# Patient Record
Sex: FEMALE | Race: WHITE | NOT HISPANIC OR LATINO | URBAN - METROPOLITAN AREA
[De-identification: names, ages, dates, MRNs, and addresses within clinical notes are randomized per-mention and may not be internally consistent; named-entity substitution may affect disease eponyms.]

---

## 2024-10-23 ENCOUNTER — EMERGENCY (EMERGENCY)
Facility: HOSPITAL | Age: 54
LOS: 1 days | Discharge: HOME | End: 2024-10-23
Attending: STUDENT IN AN ORGANIZED HEALTH CARE EDUCATION/TRAINING PROGRAM
Payer: COMMERCIAL

## 2024-10-23 VITALS
HEART RATE: 99 BPM | TEMPERATURE: 99 F | DIASTOLIC BLOOD PRESSURE: 84 MMHG | OXYGEN SATURATION: 98 % | RESPIRATION RATE: 18 BRPM | SYSTOLIC BLOOD PRESSURE: 129 MMHG

## 2024-10-23 VITALS
OXYGEN SATURATION: 98 % | TEMPERATURE: 98 F | HEIGHT: 62 IN | SYSTOLIC BLOOD PRESSURE: 145 MMHG | WEIGHT: 147.93 LBS | DIASTOLIC BLOOD PRESSURE: 87 MMHG | RESPIRATION RATE: 20 BRPM | HEART RATE: 107 BPM

## 2024-10-23 DIAGNOSIS — N93.9 ABNORMAL UTERINE AND VAGINAL BLEEDING, UNSPECIFIED: ICD-10-CM

## 2024-10-23 LAB
ALBUMIN SERPL ELPH-MCNC: 4.2 G/DL — SIGNIFICANT CHANGE UP (ref 3.3–5.2)
ALP SERPL-CCNC: 112 U/L — SIGNIFICANT CHANGE UP (ref 40–120)
ALT FLD-CCNC: 23 U/L — SIGNIFICANT CHANGE UP
ANION GAP SERPL CALC-SCNC: 14 MMOL/L — SIGNIFICANT CHANGE UP (ref 5–17)
AST SERPL-CCNC: 69 U/L — HIGH
BASOPHILS # BLD AUTO: 0.05 K/UL — SIGNIFICANT CHANGE UP (ref 0–0.2)
BASOPHILS NFR BLD AUTO: 0.7 % — SIGNIFICANT CHANGE UP (ref 0–2)
BILIRUB SERPL-MCNC: 0.2 MG/DL — LOW (ref 0.4–2)
BUN SERPL-MCNC: 10.3 MG/DL — SIGNIFICANT CHANGE UP (ref 8–20)
CALCIUM SERPL-MCNC: 9.3 MG/DL — SIGNIFICANT CHANGE UP (ref 8.4–10.5)
CHLORIDE SERPL-SCNC: 101 MMOL/L — SIGNIFICANT CHANGE UP (ref 96–108)
CO2 SERPL-SCNC: 22 MMOL/L — SIGNIFICANT CHANGE UP (ref 22–29)
CREAT SERPL-MCNC: 0.5 MG/DL — SIGNIFICANT CHANGE UP (ref 0.5–1.3)
EGFR: 110 ML/MIN/1.73M2 — SIGNIFICANT CHANGE UP
EOSINOPHIL # BLD AUTO: 0.07 K/UL — SIGNIFICANT CHANGE UP (ref 0–0.5)
EOSINOPHIL NFR BLD AUTO: 0.9 % — SIGNIFICANT CHANGE UP (ref 0–6)
GLUCOSE SERPL-MCNC: 114 MG/DL — HIGH (ref 70–99)
HCG SERPL-ACNC: <4 MIU/ML — SIGNIFICANT CHANGE UP
HCT VFR BLD CALC: 33.7 % — LOW (ref 34.5–45)
HGB BLD-MCNC: 11 G/DL — LOW (ref 11.5–15.5)
IMM GRANULOCYTES NFR BLD AUTO: 0.3 % — SIGNIFICANT CHANGE UP (ref 0–0.9)
LYMPHOCYTES # BLD AUTO: 2 K/UL — SIGNIFICANT CHANGE UP (ref 1–3.3)
LYMPHOCYTES # BLD AUTO: 26.6 % — SIGNIFICANT CHANGE UP (ref 13–44)
MCHC RBC-ENTMCNC: 27.6 PG — SIGNIFICANT CHANGE UP (ref 27–34)
MCHC RBC-ENTMCNC: 32.6 GM/DL — SIGNIFICANT CHANGE UP (ref 32–36)
MCV RBC AUTO: 84.7 FL — SIGNIFICANT CHANGE UP (ref 80–100)
MONOCYTES # BLD AUTO: 0.6 K/UL — SIGNIFICANT CHANGE UP (ref 0–0.9)
MONOCYTES NFR BLD AUTO: 8 % — SIGNIFICANT CHANGE UP (ref 2–14)
NEUTROPHILS # BLD AUTO: 4.78 K/UL — SIGNIFICANT CHANGE UP (ref 1.8–7.4)
NEUTROPHILS NFR BLD AUTO: 63.5 % — SIGNIFICANT CHANGE UP (ref 43–77)
PLATELET # BLD AUTO: 226 K/UL — SIGNIFICANT CHANGE UP (ref 150–400)
POTASSIUM SERPL-MCNC: 5.2 MMOL/L — SIGNIFICANT CHANGE UP (ref 3.5–5.3)
POTASSIUM SERPL-SCNC: 5.2 MMOL/L — SIGNIFICANT CHANGE UP (ref 3.5–5.3)
PROT SERPL-MCNC: 7.8 G/DL — SIGNIFICANT CHANGE UP (ref 6.6–8.7)
RBC # BLD: 3.98 M/UL — SIGNIFICANT CHANGE UP (ref 3.8–5.2)
RBC # FLD: 12.5 % — SIGNIFICANT CHANGE UP (ref 10.3–14.5)
SODIUM SERPL-SCNC: 137 MMOL/L — SIGNIFICANT CHANGE UP (ref 135–145)
WBC # BLD: 7.52 K/UL — SIGNIFICANT CHANGE UP (ref 3.8–10.5)
WBC # FLD AUTO: 7.52 K/UL — SIGNIFICANT CHANGE UP (ref 3.8–10.5)

## 2024-10-23 PROCEDURE — 86901 BLOOD TYPING SEROLOGIC RH(D): CPT

## 2024-10-23 PROCEDURE — 99285 EMERGENCY DEPT VISIT HI MDM: CPT

## 2024-10-23 PROCEDURE — T1013: CPT

## 2024-10-23 PROCEDURE — 76830 TRANSVAGINAL US NON-OB: CPT

## 2024-10-23 PROCEDURE — 76856 US EXAM PELVIC COMPLETE: CPT

## 2024-10-23 PROCEDURE — 84702 CHORIONIC GONADOTROPIN TEST: CPT

## 2024-10-23 PROCEDURE — 99284 EMERGENCY DEPT VISIT MOD MDM: CPT | Mod: GC

## 2024-10-23 PROCEDURE — 86900 BLOOD TYPING SEROLOGIC ABO: CPT

## 2024-10-23 PROCEDURE — 76856 US EXAM PELVIC COMPLETE: CPT | Mod: 26

## 2024-10-23 PROCEDURE — 86850 RBC ANTIBODY SCREEN: CPT

## 2024-10-23 PROCEDURE — 76830 TRANSVAGINAL US NON-OB: CPT | Mod: 26

## 2024-10-23 PROCEDURE — 80053 COMPREHEN METABOLIC PANEL: CPT

## 2024-10-23 PROCEDURE — 85025 COMPLETE CBC W/AUTO DIFF WBC: CPT

## 2024-10-23 PROCEDURE — 99284 EMERGENCY DEPT VISIT MOD MDM: CPT | Mod: 25

## 2024-10-23 PROCEDURE — 36415 COLL VENOUS BLD VENIPUNCTURE: CPT

## 2024-10-23 NOTE — CONSULT NOTE ADULT - ASSESSMENT
53 yo F with PMH HTN, ,  presenting to ED for vaginal bleeding. Patient is not having any active vaginal bleeding on exam. Source of the bleed may be perimenopausal in nature, vs leiomyoma vs endometrial ca. Patient stable, pending TVUS. Will continue to follow, further management pending labs and imaging.    All other management as per ED. 53 yo F with PMH HTN, ,  presenting to ED for heavy vaginal bleeding during outpatient endometrial biopsy.     - VS wnl, Hb 11, no active bleeding on exam. No acute gynecologic intervention necessary.   - TVUS pending, will f/u results.  - Will continue to monitor pad count. If no additional bleeding and TVUS with no concerns, patient to be discharged home with outpatient gyn follow up for D&C scheduling.     All other management as per ED. 55 yo F with PMH HTN, ,  presenting to ED for heavy vaginal bleeding during outpatient endometrial biopsy.     - VS wnl, Hb 11, no active bleeding on exam. No acute gynecologic intervention necessary.   - TVUS pending, will f/u results.  - Will continue to monitor pad count while in ED. If no additional bleeding and TVUS with no concerns, patient to be discharged home with outpatient gyn follow up for D&C scheduling.     All other management as per ED. 55 yo F with PMH HTN, ,  presenting to ED for heavy vaginal bleeding during outpatient endometrial biopsy.     - VS wnl, Hb 11, no active bleeding on exam. No acute gynecologic intervention necessary.   - TVUS results reviewed, patient will require further follow up with gynecology in the outpatient setting. TVUS conducted in ED was limited due to bowel gas. Discussed with patient importance of pursuing further care with a gynecologist, including but not limited to repeating TVUS, obtaining endometrial biopsy and results. Reviewed different causes of vaginal bleeding with patient such as perimenopause, leiomyoma and cancer. Patient expressed understanding and will make an appointment with her gynecologist by next week.  - Will continue to monitor pad count while in ED. If no additional bleeding patient to be discharged home with outpatient gyn follow up for D&C scheduling.     All other management as per ED. 55 yo F with PMH HTN, ,  presenting to ED for heavy vaginal bleeding during outpatient endometrial biopsy.     - VS wnl, Hb 11, no active bleeding on exam. No acute gynecologic intervention necessary.   - TVUS results reviewed, patient will require further follow up with gynecology in the outpatient setting. TVUS conducted in ED was limited due to bowel gas. Discussed with patient importance of pursuing further care with a gynecologist, including but not limited to repeating TVUS, obtaining endometrial biopsy and results. Reviewed different causes of vaginal bleeding with patient such as perimenopause, leiomyoma and cancer. Patient expressed understanding and will make an appointment with her gynecologist by next week.  -Return precautions provided, explained to patient if she has severe pain, repeat bleeding, or worsens in any way then to return to ED and not wait for her outpatient gynecology appointment.  - Further management per ED. If no additional bleeding patient to be discharged home with outpatient gyn follow up for D&C scheduling.

## 2024-10-23 NOTE — ED ADULT TRIAGE NOTE - CHIEF COMPLAINT QUOTE
BIBA from OBGYN office c/o vag bleeding s/p   obtaining tissue sample . Pt has  saturated 3 pads within an hour

## 2024-10-23 NOTE — ED PROVIDER NOTE - OBJECTIVE STATEMENT
pt is a 53 yo F here for vaginal bleeding. Pt states that she was at her GYN's office today when she was supposed to get an endometrial biopsy.  Pt states that they tried to start the procedure and she began to bleed heavily so they sent her to the ED.  Pt states that since then the bleeding has stopped. no abd pain. no other complaints. Paper sent over from doctor's office and it stated that pipette was inserted to start procedure and patient began to hemorrhage so no biopsy was done.

## 2024-10-23 NOTE — CONSULT NOTE ADULT - ATTENDING COMMENTS
54y  LMP early 2024, presenting to ED with vaginal bleeding that began at [facility] during endometrial biopsy, now resolved. US inconclusive, patient is aware that endometrial cancer could be causing her symptoms and that she needs to follow up regarding the results of the endometrial biopsy that she had today in her [facility]. Reassuring Hgb, encouraged iron supplementation as needed.

## 2024-10-23 NOTE — ED PROVIDER NOTE - PATIENT PORTAL LINK FT
You can access the FollowMyHealth Patient Portal offered by [facility] by registering at the following website: [email or web address]. By joining [employer]’s FollowMyHealth portal, you will also be able to view your health information using other applications (apps) compatible with our system. PROVIDER:[TOKEN:[[id number]]]

## 2024-10-23 NOTE — ED PROVIDER NOTE - CLINICAL SUMMARY MEDICAL DECISION MAKING FREE TEXT BOX
labs and imaging reviewed. no further bleeding in Ed. gyn ocnsult appreciated and recommends d/c with f/up to her gyn and return instructions.  Pt given a copy of all results and instructed to f/up with pcp regarding any abnormal results.

## 2024-10-23 NOTE — ED PROVIDER NOTE - DATE/TIME OF ACCEPTANCE
23-Oct-2024 12:07
impaired balance/narrow base of support/pain/impaired postural control/decreased strength

## 2024-10-23 NOTE — CONSULT NOTE ADULT - SUBJECTIVE AND OBJECTIVE BOX
[FreeTextEntry1] : I, Dr. Lopez, personally performed the evaluation and management (E/M) services for this new patient.  That E/M includes conducting the initial examination, assessing all conditions, and establishing the plan of care.  Today, my ACP, Yanni Mathias, was here to observe my evaluation and management services for this patient to be followed going forward.\par \par IRB   MR/TRUS FUSION GUIDED PROSTATE BIOPSY- AN IMPROVED WAY TO DETECT AND QUANTIFY PROSTATE CANCER\par \par Inclusion criteria have been reviewed and it has been determined that the subject has met all inclusion criteria.\par Exclusion criteria have been reviewed and it has been determined that the subject does not meet any exclusion criteria.\par \par The following was discussed during the consent process:\par ·	The fact that the study involves research\par ·	The study schedule and procedures involved\par ·	The main risks of the study, and the fact that all risks may not be known at this time\par ·	New information that may affect the subject’s willingness to continue on the study will be presented as soon as it is available\par ·	Benefits of participating\par ·	Alternatives to participating\par ·	Confidentiality \par ·	Compensation for research-related injury\par ·	Contacts for questions about the study or their rights while on the study\par ·	The fact that the subject’s participation is voluntary - they can refuse or withdraw \par at any time without penalty or loss of benefits.\par \par The subject was given ample time to ask questions prior to signing - all questions were answered to the subject’s satisfaction.\par \par Contact information for research staff was given to the subject.	\par The subject has expressed his/her willingness to participate.	\par \par Opportunity to sign the consent electronically was offered to the subject. Study team to contact the subject to assist with e-consenting though the [employer] platform. \par \par OR  \par \par Subject will sign printed consent on day of procedure.  \par \par  HPI:  54y , , LMP early 2024, presenting to ED with vaginal bleeding that began at [facility] after attempted endometrial biopsy. The patient states that she has not had regular periods since about a year. She was scheduled for endometrial biopsy because she began having irregular menses starting last year with heavier bleeding than normal. Menses occur every three months and last for ten days.  Over the past year she has had multiple episodes of heavy vaginal bleeding. On prior evaluation with her gynecologist, she was found to have leiomyoma. Due to her repeated bouts of heavy bleeding and luba/post menopausal state, she was scheduled for endometrial biopsy today. As per patient, during the attempted sampling procedure, she began having heavy bleeding from her vagina and soaked through 3 pads. Her gynecologist called EMS and she was brought to the hospital.     In the ED, the patient is not in acute distress, no longer actively bleeding from her vagina, and is not having any pelvic, vaginal or abdominal pain.     The patient denies being on any blood thinners, does not have a prior hx of  malignancy and has a history of tubal ligation surgery.      PMHX; HTN  PSHX; Tubal Ligation  POBHX; ,   PGYNHX: Tubal Ligation  SOCIAL: x  Allergies     No Known Allergies    MEDS:    Unknown medication for hypertension    Vital Signs Last 24 Hrs  T(C): 36.7 (23 Oct 2024 12:04), Max: 36.7 (23 Oct 2024 12:04)  T(F): 98 (23 Oct 2024 12:04), Max: 98 (23 Oct 2024 12:04)  HR: 107 (23 Oct 2024 12:04) (107 - 107)  BP: 145/87 (23 Oct 2024 12:04) (145/87 - 145/87)  BP(mean): --  RR: 20 (23 Oct 2024 12:04) (20 - 20)  SpO2: 98% (23 Oct 2024 12:04) (98% - 98%)    Parameters below as of 23 Oct 2024 12:04  Patient On (Oxygen Delivery Method): room air         PHYSICAL EXAM:  CHEST/LUNG: Clear to percussion bilaterally; No rales, rhonchi, wheezing, or rubs  HEART: Regular rate and rhythm; No murmurs, rubs, or gallops  ABDOMEN: Soft, Nontender, Nondistended; Bowel sounds present  EXTREMITIES:  2+ Peripheral Pulses, No clubbing, cyanosis, or edema  PELVIC:        EXTERNAL GENITALIA: Normal. No rashes or lesions noted.         VAGINA: dry blood noted, multiple blood clots visualized on speculum exam, no lacerations, no gross lesions, no discharge noted         CERVIX: no lesions. closed, no active bleeding through os. no CMt noted.        UTERUS: large size, nontender, mobile        ADNEXA: no adnexal masses or tenderness appreciated.    LABS:                        11.0   7.52  )-----------( 226      ( 23 Oct 2024 12:35 )             33.7     10    137  |  101  |  10.3  ----------------------------<  114[H]  5.2   |  22.0  |  0.50    Ca    9.3      23 Oct 2024 12:35    TPro  7.8  /  Alb  4.2  /  TBili  0.2[L]  /  DBili  x   /  AST  69[H]  /  ALT  23  /  AlkPhos  112  10-23    Urinalysis Basic - ( 23 Oct 2024 12:35 )    Color: x / Appearance: x / SG: x / pH: x  Gluc: 114 mg/dL / Ketone: x  / Bili: x / Urobili: x   Blood: x / Protein: x / Nitrite: x   Leuk Esterase: x / RBC: x / WBC x   Sq Epi: x / Non Sq Epi: x / Bacteria: x          RADIOLOGY STUDIES:   54y  LMP early 2024, presenting to ED with vaginal bleeding that began at [facility] during endometrial biopsy. Bleeding started once pipelle was introduced into uterus, at which case procedure was aborted and patient was sent to ED by EMS. She has saturated 3 pads since. Denies lightheadness, dizziness, fevers, chills, nausea, vomiting, pain. The patient states her menses had stopped for one year before returning this May. Since then she has had menses every 3 months lasting for 10 days. During her last menses she was seen at [facility] for heavy bleeding; TVUS at that time showed 3cm fibroid.     The patient denies being on any blood thinners, does not have a prior hx of  malignancy and has a history of tubal ligation surgery.    Outpatient GYN: Dr. Willard    PMHX; HTN  PSHX; Tubal Ligation  POBHX; ,   PGYNHX: Tubal Ligation  SOCIAL: x  Meds: denies  All: NKDA    Vital Signs Last 24 Hrs  T(C): 36.7 (23 Oct 2024 12:04), Max: 36.7 (23 Oct 2024 12:04)  T(F): 98 (23 Oct 2024 12:04), Max: 98 (23 Oct 2024 12:04)  HR: 107 (23 Oct 2024 12:04) (107 - 107)  BP: 145/87 (23 Oct 2024 12:04) (145/87 - 145/87)  RR: 20 (23 Oct 2024 12:04) (20 - 20)  SpO2: 98% (23 Oct 2024 12:04) (98% - 98%)    PHYSICAL EXAM:  CHEST/LUNG: Unlabored respirations  ABDOMEN: Soft, Nontender, Nondistended  EXTREMITIES: No clubbing, cyanosis, or edema  PELVIC: 2x3cm blood clot in vagina, small volume pooling of dark red blood, no active bleeding per os, uterus mobile and 10cm in size, cervix closed    LABS:                        11.0   7.52  )-----------( 226      ( 23 Oct 2024 12:35 )             33.7     10-23    137  |  101  |  10.3  ----------------------------<  114[H]  5.2   |  22.0  |  0.50    Ca    9.3      23 Oct 2024 12:35    TPro  7.8  /  Alb  4.2  /  TBili  0.2[L]  /  DBili  x   /  AST  69[H]  /  ALT  23  /  AlkPhos  112  10-23    Urinalysis Basic - ( 23 Oct 2024 12:35 )    Color: x / Appearance: x / SG: x / pH: x  Gluc: 114 mg/dL / Ketone: x  / Bili: x / Urobili: x   Blood: x / Protein: x / Nitrite: x   Leuk Esterase: x / RBC: x / WBC x   Sq Epi: x / Non Sq Epi: x / Bacteria:    54y  LMP early 2024, presenting to ED with vaginal bleeding that began at [facility] during endometrial biopsy. Bleeding started once pipelle was introduced into uterus, at which case procedure was aborted and patient was sent to ED by EMS. She has saturated 3 pads since. Denies lightheadness, dizziness, fevers, chills, nausea, vomiting, pain. The patient states her menses had stopped for one year before returning this May. Since then she has had menses every 3 months lasting for 10 days. During her last menses she was seen at [facility] for heavy bleeding; TVUS at that time showed 3cm fibroid.     The patient denies being on any blood thinners, does not have a prior hx of  malignancy and has a history of tubal ligation surgery.    Outpatient GYN: Dr. Willard    PMHX; HTN  PSHX; Tubal Ligation  POBHX; ,   PGYNHX: Tubal Ligation  SOCIAL: x  Meds: denies  All: NKDA    Vital Signs Last 24 Hrs  T(C): 36.7 (23 Oct 2024 12:04), Max: 36.7 (23 Oct 2024 12:04)  T(F): 98 (23 Oct 2024 12:04), Max: 98 (23 Oct 2024 12:04)  HR: 107 (23 Oct 2024 12:04) (107 - 107)  BP: 145/87 (23 Oct 2024 12:04) (145/87 - 145/87)  RR: 20 (23 Oct 2024 12:04) (20 - 20)  SpO2: 98% (23 Oct 2024 12:04) (98% - 98%)    PHYSICAL EXAM:  CHEST/LUNG: Unlabored respirations  ABDOMEN: Soft, Nontender, Nondistended  EXTREMITIES: No clubbing, cyanosis, or edema  PELVIC: 2x3cm blood clot in vagina, small volume pooling of bright red blood, no active bleeding per os, uterus mobile and 10cm in size, cervix closed    LABS:                        11.0   7.52  )-----------( 226      ( 23 Oct 2024 12:35 )             33.7     10-23    137  |  101  |  10.3  ----------------------------<  114[H]  5.2   |  22.0  |  0.50    Ca    9.3      23 Oct 2024 12:35    TPro  7.8  /  Alb  4.2  /  TBili  0.2[L]  /  DBili  x   /  AST  69[H]  /  ALT  23  /  AlkPhos  112  10-23    Urinalysis Basic - ( 23 Oct 2024 12:35 )    Color: x / Appearance: x / SG: x / pH: x  Gluc: 114 mg/dL / Ketone: x  / Bili: x / Urobili: x   Blood: x / Protein: x / Nitrite: x   Leuk Esterase: x / RBC: x / WBC x   Sq Epi: x / Non Sq Epi: x / Bacteria:

## 2024-10-23 NOTE — ED PROVIDER NOTE - PHYSICAL EXAMINATION
Constitutional - well-developed.   Head - NCAT. Airway patent.   Eyes - PERRL.   CV - RRR. no murmur. no edema.   Pulm - CTAB.   Abd - soft, nt. no rebound. no guarding.   Neuro - A&Ox3. strength 5/5 x4. sensation intact x4. normal gait.   Skin - No rash. .  MSK - normal ROM.    external vaginal exam chaperoned by anshu lockwood - small dried blood. no active bleeding.

## 2024-10-31 PROBLEM — Z00.00 ENCOUNTER FOR PREVENTIVE HEALTH EXAMINATION: Status: ACTIVE | Noted: 2024-10-31

## 2024-11-21 ENCOUNTER — APPOINTMENT (OUTPATIENT)
Dept: GYNECOLOGIC ONCOLOGY | Facility: CLINIC | Age: 54
End: 2024-11-21